# Patient Record
Sex: FEMALE | Race: WHITE | NOT HISPANIC OR LATINO | URBAN - METROPOLITAN AREA
[De-identification: names, ages, dates, MRNs, and addresses within clinical notes are randomized per-mention and may not be internally consistent; named-entity substitution may affect disease eponyms.]

---

## 2018-12-20 ENCOUNTER — OFFICE VISIT - HEALTHEAST (OUTPATIENT)
Dept: FAMILY MEDICINE | Facility: CLINIC | Age: 23
End: 2018-12-20

## 2018-12-20 DIAGNOSIS — J06.9 VIRAL UPPER RESPIRATORY TRACT INFECTION: ICD-10-CM

## 2018-12-20 LAB — DEPRECATED S PYO AG THROAT QL EIA: NORMAL

## 2018-12-20 RX ORDER — NORETHINDRONE ACETATE AND ETHINYL ESTRADIOL AND FERROUS FUMARATE 1.5-30(21)
1 KIT ORAL DAILY
Refills: 3 | Status: SHIPPED | COMMUNITY
Start: 2018-10-08

## 2018-12-21 LAB — GROUP A STREP BY PCR: NORMAL

## 2021-06-02 VITALS — WEIGHT: 140.7 LBS

## 2021-06-22 NOTE — PROGRESS NOTES
Subjective:   Bianca Gupta is a(n) 23 y.o. White or  female who presents to [facility] with the following complaint(s):  Sore Throat (x 4 days, Dull headache)    History of Present Illness:  Primary symptom: Sore throat  Onset: 4 day(s) ago  Progression: Worsening  Hoarseness: Yes  Dysphagia: Yes  Drooling: No  Neck swelling: No  Fevers: No  Chills: No  Headache: Yes, dull  Rash: No  Abdominal pain: No, but nauseated  Upper respiratory symptoms: Nasal congestion and clear rhinorrhea  Additional symptoms: Wet cough for the past 1-2 days  Home therapies utilized: Acetaminophen, ibuprofen, throat sprays, throat lozenges  History of recurrent strep: No  History of peritonsillar abscess: No  Exposure to strep: No  Exposure to mono: No  Tobacco use / exposure: No    The following portions of the patient's history were reviewed and updated as appropriate: allergies, current medications, past family history, past medical history, past social history, past surgical history and problem list.    Review of Systems:   Review of Systems   All other systems reviewed and are negative.    Objective:     Vitals:    12/20/18 0724   BP: 132/90   Patient Site: Right Arm   Patient Position: Sitting   Cuff Size: Adult Regular   Pulse: (!) 136   Resp: 20   Temp: 99.4  F (37.4  C)   TempSrc: Oral   SpO2: 100%   Weight: 140 lb 11.2 oz (63.8 kg)     Physical Exam  Laboratory:  Results for orders placed or performed in visit on 12/20/18   Rapid Strep A Screen-Throat   Result Value Ref Range    Rapid Strep A Antigen No Group A Strep detected, presumptive negative No Group A Strep detected, presumptive negative       Radiology:  N/A    Electrocardiogram:  N/A    Assessment/Plan   1. Viral upper respiratory tract infection  - Rapid Strep A Screen-Throat  - Group A Strep, RNA Direct Detection, Throat    - Strep screen negative. Reflex testing in process; will prescribe amoxicillin if positive. Discussed symptomatic / supportive  cares.   - Counseled patient regarding assessment and plan for evaluation and treatment. Questions were answered. See AVS for the specific written instructions and educational handout(s) regarding viral URI and sore throat that were provided at the conclusion of the visit.   - Discussed signs / symptoms that warrant urgent / emergent medical attention.   - Work excuse provided for today due to illness.   - Follow up as needed.     Joe Vásquez MD